# Patient Record
Sex: MALE | URBAN - METROPOLITAN AREA
[De-identification: names, ages, dates, MRNs, and addresses within clinical notes are randomized per-mention and may not be internally consistent; named-entity substitution may affect disease eponyms.]

---

## 2023-11-15 ENCOUNTER — APPOINTMENT (OUTPATIENT)
Dept: URBAN - METROPOLITAN AREA CLINIC 296 | Age: 33
Setting detail: DERMATOLOGY
End: 2023-11-16

## 2023-11-15 DIAGNOSIS — A51 EARLY SYPHILIS: ICD-10-CM

## 2023-11-15 DIAGNOSIS — A63.0 ANOGENITAL (VENEREAL) WARTS: ICD-10-CM

## 2023-11-15 PROBLEM — A51.0: Status: ACTIVE | Noted: 2023-11-15

## 2023-11-15 PROCEDURE — OTHER ADDITIONAL NOTES: OTHER

## 2023-11-15 PROCEDURE — 99202 OFFICE O/P NEW SF 15 MIN: CPT | Mod: 25

## 2023-11-15 PROCEDURE — OTHER LIQUID NITROGEN GENITALS: OTHER

## 2023-11-15 PROCEDURE — OTHER MIPS QUALITY: OTHER

## 2023-11-15 PROCEDURE — OTHER COUNSELING: OTHER

## 2023-11-15 PROCEDURE — 54056 CRYOSURGERY PENIS LESION(S): CPT

## 2023-11-15 ASSESSMENT — LOCATION SIMPLE DESCRIPTION DERM
LOCATION SIMPLE: VENTRAL PENIS
LOCATION SIMPLE: DORSAL PENIS
LOCATION SIMPLE: RIGHT PENIS
LOCATION SIMPLE: RIGHT SUPRAPUBIC REGION

## 2023-11-15 ASSESSMENT — LOCATION DETAILED DESCRIPTION DERM
LOCATION DETAILED: MID-VENTRAL PENILE SHAFT
LOCATION DETAILED: RIGHT LATERAL MID-VENTRAL PENILE SHAFT
LOCATION DETAILED: DISTAL VENTRAL GLANS PENIS
LOCATION DETAILED: MID-DORSAL PENILE SHAFT
LOCATION DETAILED: RIGHT PENIS BASE
LOCATION DETAILED: RIGHT SUPRAPUBIC REGION

## 2023-11-15 ASSESSMENT — LOCATION ZONE DERM
LOCATION ZONE: TRUNK
LOCATION ZONE: PENIS

## 2023-11-15 NOTE — PROCEDURE: ADDITIONAL NOTES
Render Risk Assessment In Note?: no
Additional Notes: The lesion is rubbing on clothing and remains irritated.\\nI explained that the lesion on the head of the penis is consistent with a chancre. He has a history of syphilis. He and his wife were diagnosed 2-3 years ago. The syphilis was identified when his wife became pregnant and the OBGYN did an STD panel. They were both treated with oral medication. She is pregnant with their second child currently. I explained the importance of he and his wife having bloodwork and being treated with an injection of penicillin G. We are unable to order penicillin G through our formulary. I recommended that he go to the health department for treatment and the appropriate bloodwork monitoring.
Detail Level: Simple